# Patient Record
Sex: MALE | Race: WHITE | NOT HISPANIC OR LATINO
[De-identification: names, ages, dates, MRNs, and addresses within clinical notes are randomized per-mention and may not be internally consistent; named-entity substitution may affect disease eponyms.]

---

## 2020-06-29 PROBLEM — Z00.00 ENCOUNTER FOR PREVENTIVE HEALTH EXAMINATION: Status: ACTIVE | Noted: 2020-06-29

## 2020-06-30 ENCOUNTER — APPOINTMENT (OUTPATIENT)
Dept: PAIN MANAGEMENT | Facility: CLINIC | Age: 85
End: 2020-06-30
Payer: MEDICARE

## 2020-06-30 VITALS
TEMPERATURE: 97.7 F | SYSTOLIC BLOOD PRESSURE: 130 MMHG | HEIGHT: 74 IN | WEIGHT: 250 LBS | DIASTOLIC BLOOD PRESSURE: 70 MMHG | BODY MASS INDEX: 32.08 KG/M2

## 2020-06-30 PROCEDURE — 99204 OFFICE O/P NEW MOD 45 MIN: CPT

## 2020-06-30 RX ORDER — GABAPENTIN 100 MG/1
CAPSULE ORAL
Refills: 0 | Status: ACTIVE | COMMUNITY

## 2020-06-30 RX ORDER — LEVOTHYROXINE SODIUM 137 UG/1
TABLET ORAL
Refills: 0 | Status: ACTIVE | COMMUNITY

## 2020-06-30 RX ORDER — CELECOXIB 50 MG/1
CAPSULE ORAL
Refills: 0 | Status: ACTIVE | COMMUNITY

## 2020-06-30 RX ORDER — OXYCODONE HYDROCHLORIDE 30 MG/1
TABLET ORAL
Refills: 0 | Status: ACTIVE | COMMUNITY

## 2020-06-30 NOTE — CONSULT LETTER
[Dear  ___] : Dear  [unfilled], [Consult Letter:] : I had the pleasure of evaluating your patient, [unfilled]. [Consult Closing:] : Thank you very much for allowing me to participate in the care of this patient.  If you have any questions, please do not hesitate to contact me. [Please see my note below.] : Please see my note below. [FreeTextEntry3] : \par Dayton Muniz DO, MBA\par Director, Pain Management Center\par  of Anesthesiology\par [facility] [employer] [facility]\par \par \par  [Sincerely,] : Sincerely,

## 2020-06-30 NOTE — ASSESSMENT
[FreeTextEntry1] : I personally reviewed the relevant imaging.  Discussed and explained to patient the likely source of pathology and pain.  Questions answered.\par \par persistent pain secondary to lumbosacral stenosis and postlaminectomy pain demonstrated on imaging refractory to conservative treatments, will schedule caudal epidural steroid injection r/b/a discussed\par \par informed patient of risks of steroid administration including transient worsening of blood glucose, htn, mood changes, progressive osteoporosis.  Encouraged to call with questions and concerns.\par \par s/p covid infection - now recovered\par \par ASIPP COVID Morbidity risk stratification:\par \par Age 4\par Pulmonary 0\par CVS \par Obesity 3\par DM 0\par Renal 0 \par Hepatic 0\par Immune 0\par \par 7  low risk for covid related morbidity -  discussed r/b/a, patient wishes to proceed\par \par Will schedule above interventional pain procedure because further delay may cause harm or negative outcome to patient.  The goal in performing this procedure is to avoid deterioration of function, emergency room visits (which increases exposure) and reliance on opioids.  \par \par r/b/a discussed with patient, lack of evidence to conclusively determine whether pain management procedures have any positive or negative impact on the possibility of renetta the virus and/or development of any sequelae. \par \par Informed patient that risks associated with the COVID-19 infection.  Informed patient steps taken to limit the risks.  We are implementing safety precautions and following protocols consistent with the CDC and state recommendations. All patients and staff will be checked for fever or signs of illness upon entry to the facility. We will limit our steroid dose to the lowest effective therapeutic dose or in some cases steroids will not be injected at all. \par \par Patient agrees to proceed\par \par may be candidate for scs trial - information provided\par \par \par The above diagnosis and treatment plan is medically reasonable and necessary based on the patient encounter.\par \par There were no barriers to communication.\par Informed patient that I would be available for any additional questions.\par Patient was instructed to call with any worsening symptoms including severe pain, new numbness/weakness, or changes in the bowel/bladder function. \par \par Regarding opiate medication to manage pain. I had a detailed discussion with the patient regarding the risks of long-term opioid use, including the potential for medication side effects, hyperaglesia, endocrine dysfunction, addiction, tolerance, constipation, among others. Discussed relevant risks. \par \par Recommended weaning with current provider\par \par Discussed role of nsaids in pain management and all relevant risks, if patient is continuing to require after 4 weeks the patient should f/u for alternative treatment. \par Instructed patient to maintain pain diary to monitor pain level, mobility, and function.\par \par The referring provider was informed of the above diagnosis and treatment plan.\par

## 2020-06-30 NOTE — HISTORY OF PRESENT ILLNESS
[___ yrs] : [unfilled] year(s) ago [Constant] : constant [Sharp] : sharp [Medications] : medications [7] : a maximum pain level of 7/10 [FreeTextEntry1] : HPI\par \par Mr. HARLEEN WALLACE is a 85 year M with pmhx of left hip repalcement s/p multiple revisions, bilateral rotator cuff dysfunction lumbar fusion/decompression, and prostate ca s/p seeding with subsequent incontinence presents with worsening right buttock pain that is worse with transition and movement.  At times, the pain radiates to posterior thigh and calf. \par \par Of note patient states that he recovered from COVID-19 infection in March, denies any signs of infection including sob, fever, chills.\par \par \par Previous and current pain medications/doses/effects:\par \par Oxycodone 10mg q4h with improvement\par gabapentin 300/300/600 with improvement\par \par Previous Pain Treatments:\par \par PT without improvement\par \par Previous Pain Injections:\par \par Gluteal bursa injection over 5 months ago with improvement\par Previous Diagnostic Studies/Images:\par \par \par MRI LS 9/2019\par \par Surgical pedicular screw fixation L3 on L4 identified\par \par L1-2 bilateral facet hypertrophy is identified with resultant mild central and bilateral foraminal stenosis\par \par L2-3 grade 1 spondylitic the cyst with marketed disc space narrowing is identified bilateral neural foraminal stenosis is noted.\par \par L3-4 postsurgical changes are noted. The thecal sac is decompressed.\par \par L4-5 mild annular disc bulges identified however the thecal sac as well decompressed through the tenotomy defect.\par \par L5-S1 no focal disc herniation or neurocompressive changes are seen\par \par  [FreeTextEntry2] : 21 [FreeTextEntry7] : Right hip pain [FreeTextEntry3] : n/a

## 2020-06-30 NOTE — REASON FOR VISIT
[Initial Consultation] : an initial pain management consultation [FreeTextEntry2] : referred by Dr. Adwoa Gray for evaluation of back pain

## 2020-06-30 NOTE — REVIEW OF SYSTEMS
[Incontinence] : incontinence [Joint Stiffness] : joint stiffness [Joint Pain] : joint pain [Negative] : Heme/Lymph [FreeTextEntry3] : glasses [FreeTextEntry4] : hearing aids [FreeTextEntry9] : swelling

## 2020-06-30 NOTE — PHYSICAL EXAM
[Normal muscle bulk without asymmetry] : normal muscle bulk without asymmetry [Facet Tenderness] : facet tenderness [Spine: Flexion to ___ degrees, without pain] : spine: flexion to [unfilled] degrees, without pain [Antalgic] : antalgic [Wheelchair] : uses a wheelchair [] : Motor: [NL] : normal and symmetric bilaterally [Normal] : Normal affect [de-identified] : Constitutional: Normal, well developed, no acute distress\par Eyes: Symmetric, External structures \par Oropharynx: Lips normal, symmetric, no external lesions appreciated\par Respiratory: Non-labored breathing, no audible wheezes\par Cardiac: Pulse palpated, no tachycardia\par Vascular: No cyanosis appreciated, no edema in bilateral lower extremities\par GI: Nondistended, no jaundice appreciated\par Neurovascular: CN2-12 grossly intact, Alert and oriented\par MSK: Normal muscle bulk, 5/5 Motor strength B/L in LE\par \par

## 2020-07-13 ENCOUNTER — RESULT REVIEW (OUTPATIENT)
Age: 85
End: 2020-07-13

## 2020-07-16 ENCOUNTER — RESULT REVIEW (OUTPATIENT)
Age: 85
End: 2020-07-16

## 2020-07-16 ENCOUNTER — APPOINTMENT (OUTPATIENT)
Dept: PAIN MANAGEMENT | Facility: HOSPITAL | Age: 85
End: 2020-07-16

## 2020-08-18 ENCOUNTER — APPOINTMENT (OUTPATIENT)
Dept: PAIN MANAGEMENT | Facility: CLINIC | Age: 85
End: 2020-08-18
Payer: MEDICARE

## 2020-08-18 VITALS
BODY MASS INDEX: 32.08 KG/M2 | WEIGHT: 250 LBS | TEMPERATURE: 97.6 F | HEIGHT: 74 IN | DIASTOLIC BLOOD PRESSURE: 70 MMHG | SYSTOLIC BLOOD PRESSURE: 110 MMHG

## 2020-08-18 PROCEDURE — 99214 OFFICE O/P EST MOD 30 MIN: CPT

## 2020-08-18 NOTE — ASSESSMENT
[FreeTextEntry1] : \par persistent pain secondary to lumbosacral stenosis and postlaminectomy pain demonstrated on imaging refractory to conservative treatments,s/p  caudal epidural steroid injection start PT - referral provided\par \par subacromial bursitis and rotator cuff arthropathy pain refractory to conservative treatments.\par \par XR to evaluate arthropathy\par \par  will schedule RIGHT subacromial bursa steroid injection r/b/a discussed\par \par informed patient of risks of steroid administration including transient worsening of blood glucose, htn, mood changes, progressive osteoporosis.  Encouraged to call with questions and concerns.\par \par chronic UTI  r/b/a discussed.  Intervention may be associated with minimal increase risk of worsening infection.  patient understands and wishes to proceed\par \par Will schedule above interventional pain procedure because further delay may cause harm or negative outcome to patient.  The goal in performing this procedure is to avoid deterioration of function, emergency room visits (which increases exposure) and reliance on opioids.  \par \par r/b/a discussed with patient, lack of evidence to conclusively determine whether pain management procedures have any positive or negative impact on the possibility of renetta the virus and/or development of any sequelae. \par \par Informed patient that risks associated with the COVID-19 infection.  Informed patient steps taken to limit the risks.  We are implementing safety precautions and following protocols consistent with the CDC and state recommendations. All patients and staff will be checked for fever or signs of illness upon entry to the facility. We will limit our steroid dose to the lowest effective therapeutic dose or in some cases steroids will not be injected at all. \par \par Patient agrees to proceed\par \par may be candidate for scs trial - information provided\par \par \par The above diagnosis and treatment plan is medically reasonable and necessary based on the patient encounter.\par \par There were no barriers to communication.\par Informed patient that I would be available for any additional questions.\par Patient was instructed to call with any worsening symptoms including severe pain, new numbness/weakness, or changes in the bowel/bladder function. \par \par Regarding opiate medication to manage pain. I had a detailed discussion with the patient regarding the risks of long-term opioid use, including the potential for medication side effects, hyperaglesia, endocrine dysfunction, addiction, tolerance, constipation, among others. Discussed relevant risks. \par \par Recommended weaning with current provider\par \par Discussed role of nsaids in pain management and all relevant risks, if patient is continuing to require after 4 weeks the patient should f/u for alternative treatment. \par Instructed patient to maintain pain diary to monitor pain level, mobility, and function.\par \par \par

## 2020-08-18 NOTE — PHYSICAL EXAM
[Normal muscle bulk without asymmetry] : normal muscle bulk without asymmetry [Facet Tenderness] : facet tenderness [Spine: Flexion to ___ degrees, without pain] : spine: flexion to [unfilled] degrees, without pain [Normal] : Normal affect [Spurling] : positive Spurling's Test [Medeiros] : positive Medeiros Test

## 2020-08-21 ENCOUNTER — RESULT REVIEW (OUTPATIENT)
Age: 85
End: 2020-08-21

## 2020-09-07 ENCOUNTER — RESULT REVIEW (OUTPATIENT)
Age: 85
End: 2020-09-07

## 2020-09-10 ENCOUNTER — APPOINTMENT (OUTPATIENT)
Dept: PAIN MANAGEMENT | Facility: HOSPITAL | Age: 85
End: 2020-09-10

## 2020-09-25 ENCOUNTER — APPOINTMENT (OUTPATIENT)
Dept: PAIN MANAGEMENT | Facility: CLINIC | Age: 85
End: 2020-09-25

## 2020-09-29 ENCOUNTER — APPOINTMENT (OUTPATIENT)
Dept: PAIN MANAGEMENT | Facility: CLINIC | Age: 85
End: 2020-09-29
Payer: MEDICARE

## 2020-09-29 PROCEDURE — 99213 OFFICE O/P EST LOW 20 MIN: CPT | Mod: 95

## 2020-09-29 RX ORDER — OXYCODONE AND ACETAMINOPHEN 10; 325 MG/1; MG/1
10-325 TABLET ORAL
Qty: 120 | Refills: 0 | Status: ACTIVE | COMMUNITY
Start: 2020-09-22

## 2020-09-29 RX ORDER — SILVER SULFADIAZINE 10 MG/G
1 CREAM TOPICAL
Qty: 50 | Refills: 0 | Status: ACTIVE | COMMUNITY
Start: 2020-09-08

## 2020-09-29 RX ORDER — PHENAZOPYRIDINE HYDROCHLORIDE 100 MG/1
100 TABLET ORAL
Qty: 12 | Refills: 0 | Status: ACTIVE | COMMUNITY
Start: 2020-04-12

## 2020-09-29 RX ORDER — QUETIAPINE FUMARATE 25 MG/1
25 TABLET ORAL
Qty: 30 | Refills: 0 | Status: ACTIVE | COMMUNITY
Start: 2020-02-07

## 2020-09-29 RX ORDER — LEVOTHYROXINE SODIUM 0.03 MG/1
25 TABLET ORAL
Qty: 90 | Refills: 0 | Status: ACTIVE | COMMUNITY
Start: 2020-09-24

## 2020-09-29 RX ORDER — QUETIAPINE FUMARATE 50 MG/1
50 TABLET ORAL
Qty: 30 | Refills: 0 | Status: ACTIVE | COMMUNITY
Start: 2020-07-01

## 2020-09-29 RX ORDER — SULFAMETHOXAZOLE AND TRIMETHOPRIM 400; 80 MG/1; MG/1
400-80 TABLET ORAL
Qty: 42 | Refills: 0 | Status: ACTIVE | COMMUNITY
Start: 2020-07-30

## 2020-09-29 RX ORDER — DICLOFENAC SODIUM 10 MG/G
1 GEL TOPICAL
Qty: 200 | Refills: 0 | Status: ACTIVE | COMMUNITY
Start: 2020-08-10

## 2020-09-29 RX ORDER — MUPIROCIN 20 MG/G
2 OINTMENT TOPICAL
Qty: 22 | Refills: 0 | Status: ACTIVE | COMMUNITY
Start: 2020-03-30

## 2020-09-29 RX ORDER — CELECOXIB 200 MG/1
200 CAPSULE ORAL
Qty: 60 | Refills: 0 | Status: ACTIVE | COMMUNITY
Start: 2020-09-22

## 2020-09-29 RX ORDER — LEVOTHYROXINE SODIUM 0.2 MG/1
200 TABLET ORAL
Qty: 90 | Refills: 0 | Status: ACTIVE | COMMUNITY
Start: 2020-09-24

## 2020-09-29 RX ORDER — FUROSEMIDE 20 MG/1
20 TABLET ORAL
Qty: 90 | Refills: 0 | Status: ACTIVE | COMMUNITY
Start: 2020-06-08

## 2020-09-29 RX ORDER — TRAZODONE HYDROCHLORIDE 50 MG/1
50 TABLET ORAL
Qty: 30 | Refills: 0 | Status: ACTIVE | COMMUNITY
Start: 2020-02-24

## 2020-09-29 RX ORDER — PHENAZOPYRIDINE HYDROCHLORIDE 200 MG/1
200 TABLET ORAL
Qty: 9 | Refills: 0 | Status: ACTIVE | COMMUNITY
Start: 2020-07-28

## 2020-09-29 RX ORDER — MIRTAZAPINE 7.5 MG/1
7.5 TABLET, FILM COATED ORAL
Qty: 60 | Refills: 0 | Status: ACTIVE | COMMUNITY
Start: 2020-08-18

## 2020-09-29 RX ORDER — HYDROCORTISONE 25 MG/G
2.5 CREAM TOPICAL
Qty: 30 | Refills: 0 | Status: ACTIVE | COMMUNITY
Start: 2020-03-30

## 2020-09-29 RX ORDER — ESCITALOPRAM OXALATE 5 MG/1
5 TABLET ORAL
Qty: 30 | Refills: 0 | Status: ACTIVE | COMMUNITY
Start: 2020-04-23

## 2020-09-29 NOTE — ASSESSMENT
[FreeTextEntry1] : \par persistent pain secondary to lumbosacral stenosis and postlaminectomy pain demonstrated on imaging refractory to conservative treatments,s/p  caudal epidural steroid injection start PT - referral provided\par \par subacromial bursitis and rotator cuff arthropathy pain refractory to conservative treatments.\par \par XR to evaluate arthropathy\par \par s/p  RIGHT subacromial bursa steroid injection\par \par start PT - referral provided\par \par may be candidate for scs trial - information provided\par \par \par The above diagnosis and treatment plan is medically reasonable and necessary based on the patient encounter.\par \par There were no barriers to communication.\par Informed patient that I would be available for any additional questions.\par Patient was instructed to call with any worsening symptoms including severe pain, new numbness/weakness, or changes in the bowel/bladder function. \par \par Regarding opiate medication to manage pain. I had a detailed discussion with the patient regarding the risks of long-term opioid use, including the potential for medication side effects, hyperaglesia, endocrine dysfunction, addiction, tolerance, constipation, among others. Discussed relevant risks. \par \par Recommended weaning with current provider\par \par Discussed role of nsaids in pain management and all relevant risks, if patient is continuing to require after 4 weeks the patient should f/u for alternative treatment. \par Instructed patient to maintain pain diary to monitor pain level, mobility, and function.\par \par \par

## 2020-09-29 NOTE — HISTORY OF PRESENT ILLNESS
[___ yrs] : [unfilled] year(s) ago [Constant] : constant [7] : a maximum pain level of 7/10 [Sharp] : sharp [Medications] : medications [Home] : at home, [unfilled] , at the time of the visit. [Medical Office: ([facility])___] : at the medical office located in  [Verbal consent obtained from patient] : the patient, [unfilled] [FreeTextEntry1] : \par Interval Note:\par \par s/p right subacromial bursa steroid injection 9/10/2020 with improvement in right shoulder pain.  \par \par Continues to have improvement in back pain with PT \par \par Since last visit the pain is improved. Denies any additional weakness, numbness, bowel/bladder dysfunction.  Pain intensity is\par \par \par HPI\par \par Mr. HARLEEN WALLACE is a 85 year M with pmhx of left hip repalcement s/p multiple revisions, bilateral rotator cuff dysfunction lumbar fusion/decompression, and prostate ca s/p seeding with subsequent incontinence presents with worsening right buttock pain that is worse with transition and movement.  At times, the pain radiates to posterior thigh and calf. \par \par Of note patient states that he recovered from COVID-19 infection in March, denies any signs of infection including sob, fever, chills.\par \par \par Previous and current pain medications/doses/effects:\par \par Oxycodone 10mg q4h with improvement\par gabapentin 300/300/600 with improvement\par \par Previous Pain Treatments:\par \par PT without improvement\par \par Previous Pain Injections:\par caudal epidural steroid injection 6/16/2020\par Gluteal bursa injection over 5 months ago with improvement\par Previous Diagnostic Studies/Images:\par \par XR R Shoulder 8/20\par \par  Significant degenerative changes of the right shoulder girdle are identified with a high\par riding humeral head abutting the coracoid process and remodeling of the glenohumeral joint including\par associated bony sclerosis and degenerative changes. The subacromial space appears maintained.\par Suggestion of widening of the acromioclavicular joint with slight step off may represent a chronic\par separation. There is no evidence of fracture or suspicious lesion.\par \par  Further evaluation may include cross-sectional imaging as clinically warranted\par \par \par MRI LS 9/2019\par \par Surgical pedicular screw fixation L3 on L4 identified\par \par L1-2 bilateral facet hypertrophy is identified with resultant mild central and bilateral foraminal stenosis\par \par L2-3 grade 1 spondylitic the cyst with marketed disc space narrowing is identified bilateral neural foraminal stenosis is noted.\par \par L3-4 postsurgical changes are noted. The thecal sac is decompressed.\par \par L4-5 mild annular disc bulges identified however the thecal sac as well decompressed through the tenotomy defect.\par \par L5-S1 no focal disc herniation or neurocompressive changes are seen\par \par  [FreeTextEntry7] : Right hip pain [FreeTextEntry3] : n/a

## 2020-10-26 ENCOUNTER — APPOINTMENT (OUTPATIENT)
Dept: PAIN MANAGEMENT | Facility: CLINIC | Age: 85
End: 2020-10-26
Payer: MEDICARE

## 2020-10-26 PROCEDURE — 99213 OFFICE O/P EST LOW 20 MIN: CPT | Mod: 95

## 2020-10-26 RX ORDER — AMOXICILLIN 500 MG/1
500 CAPSULE ORAL
Qty: 21 | Refills: 0 | Status: ACTIVE | COMMUNITY
Start: 2020-10-15

## 2020-10-26 NOTE — PHYSICAL EXAM
[Normal] : Well developed, in no acute distress, alert and oriented to person, place and time [de-identified] : \par Constitutional: Normal, well developed, no acute distress on audio/video examination\par Eyes: Symmetric, External structures on video examination\par ENT: Lips, mucosa and tongue normal on video examination\par Oropharynx: Lips normal, symmetric, no external lesions appreciated appreciated on video examination\par Respiratory: Non-labored breathing, no audible wheezes appreciated on audio/video examination\par Vascular: No cyanosis appreciated or edema appreciated on video examination\par GI:  no jaundice appreciated on video examination\par Neurovascular: CN grossly intact on video/audio examination, alert\par MSK: Normal muscle bulk on video examination\par

## 2020-10-26 NOTE — ASSESSMENT
[FreeTextEntry1] : \par persistent pain secondary to lumbosacral stenosis and postlaminectomy pain demonstrated on imaging refractory to conservative treatments,s/p  caudal epidural steroid injection start PT - referral provided\par \par May consider repeat intervention\par \par continues to require oxycodone - I recommended alternative interventions including facet treatments and scs trial \par \par subacromial bursitis and rotator cuff arthropathy pain refractory to conservative treatments.\par \par \par s/p  RIGHT subacromial bursa steroid injection\par \par PT\par \par may be candidate for scs trial - information provided\par \par start OT for hand osteoarthritis\par \par The above diagnosis and treatment plan is medically reasonable and necessary based on the patient encounter.\par \par There were no barriers to communication.\par Informed patient that I would be available for any additional questions.\par Patient was instructed to call with any worsening symptoms including severe pain, new numbness/weakness, or changes in the bowel/bladder function. \par \par Regarding opiate medication to manage pain. I had a detailed discussion with the patient regarding the risks of long-term opioid use, including the potential for medication side effects, hyperaglesia, endocrine dysfunction, addiction, tolerance, constipation, among others. Discussed relevant risks. \par \par Recommended weaning with current provider\par \par Discussed role of nsaids in pain management and all relevant risks, if patient is continuing to require after 4 weeks the patient should f/u for alternative treatment. \par Instructed patient to maintain pain diary to monitor pain level, mobility, and function.\par \par I explained to patient benefits and limitation of TeleMedicine visits\par \par Patient understands that limitations include inability to perform comprehensive physical exam, which may lead to potential diagnostic inconsistencies.  \par \par Patient understands that diagnosis and treatment may be limited by these inconsistencies and patient agrees to proceed with care plan\par \par \par \par

## 2020-11-23 ENCOUNTER — APPOINTMENT (OUTPATIENT)
Dept: PAIN MANAGEMENT | Facility: CLINIC | Age: 85
End: 2020-11-23
Payer: MEDICARE

## 2020-11-23 DIAGNOSIS — M96.1 POSTLAMINECTOMY SYNDROME, NOT ELSEWHERE CLASSIFIED: ICD-10-CM

## 2020-11-23 DIAGNOSIS — F11.90 OPIOID USE, UNSPECIFIED, UNCOMPLICATED: ICD-10-CM

## 2020-11-23 DIAGNOSIS — M79.18 MYALGIA, OTHER SITE: ICD-10-CM

## 2020-11-23 DIAGNOSIS — M54.16 RADICULOPATHY, LUMBAR REGION: ICD-10-CM

## 2020-11-23 DIAGNOSIS — M75.51 BURSITIS OF RIGHT SHOULDER: ICD-10-CM

## 2020-11-23 DIAGNOSIS — M12.819 OTHER SPECIFIC ARTHROPATHIES, NOT ELSEWHERE CLASSIFIED, UNSPECIFIED SHOULDER: ICD-10-CM

## 2020-11-23 DIAGNOSIS — G89.4 CHRONIC PAIN SYNDROME: ICD-10-CM

## 2020-11-23 PROCEDURE — 99213 OFFICE O/P EST LOW 20 MIN: CPT | Mod: 95

## 2020-11-23 NOTE — ASSESSMENT
[FreeTextEntry1] : \par persistent pain secondary to lumbosacral stenosis and postlaminectomy pain demonstrated on imaging refractory to conservative treatments,s/p  caudal epidural steroid injection\par \par Start PT - may consider caudal aries\par \par May consider repeat intervention\par \par continues to require oxycodone - I recommended alternative interventions including facet treatments and scs trial \par \par subacromial bursitis and rotator cuff arthropathy pain refractory to conservative treatments.\par \par start PT facet arthropathy\par \par s/p  RIGHT subacromial bursa steroid injection\par \par PT\par \par may be candidate for scs trial - information provided\par \par to start OT for hand osteoarthritis\par \par The above diagnosis and treatment plan is medically reasonable and necessary based on the patient encounter.\par \par There were no barriers to communication.\par Informed patient that I would be available for any additional questions.\par Patient was instructed to call with any worsening symptoms including severe pain, new numbness/weakness, or changes in the bowel/bladder function. \par \par Regarding opiate medication to manage pain. I had a detailed discussion with the patient regarding the risks of long-term opioid use, including the potential for medication side effects, hyperaglesia, endocrine dysfunction, addiction, tolerance, constipation, among others. Discussed relevant risks. \par \par Recommended weaning with current provider (Dr. Robertson)\par \par Discussed role of nsaids in pain management and all relevant risks, if patient is continuing to require after 4 weeks the patient should f/u for alternative treatment. \par Instructed patient to maintain pain diary to monitor pain level, mobility, and function.\par \par I explained to patient benefits and limitation of TeleMedicine visits\par \par Patient understands that limitations include inability to perform comprehensive physical exam, which may lead to potential diagnostic inconsistencies.  \par \par Patient understands that diagnosis and treatment may be limited by these inconsistencies and patient agrees to proceed with care plan\par \par \par \par

## 2020-11-23 NOTE — HISTORY OF PRESENT ILLNESS
[___ yrs] : [unfilled] year(s) ago [Constant] : constant [7] : a maximum pain level of 7/10 [Sharp] : sharp [Medications] : medications [Home] : at home, [unfilled] , at the time of the visit. [Medical Office: ([facility])___] : at the medical office located in  [Verbal consent obtained from patient] : the patient, [unfilled] [FreeTextEntry1] : \par Interval Note:\par \par Continues PT.  Complains of mild bilateral leg pain.  He states that he continues on percocet 10/325 daily  \par \par Continues to have improvement in back pain with PT \par \par Since last visit the pain is improved. Denies any additional weakness, numbness, bowel/bladder dysfunction.  \par \par \par HPI\par \par Mr. HARLEEN WALLACE is a 85 year M with pmhx of left hip replacement s/p multiple revisions, bilateral rotator cuff dysfunction lumbar fusion/decompression, and prostate ca s/p seeding with subsequent incontinence presents with worsening right buttock pain that is worse with transition and movement.  At times, the pain radiates to posterior thigh and calf. 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 The subacromial space appears maintained.\par Suggestion of widening of the acromioclavicular joint with slight step off may represent a chronic\par separation. There is no evidence of fracture or suspicious lesion.\par \par  Further evaluation may include cross-sectional imaging as clinically warranted\par \par \par MRI LS 9/2019\par \par Surgical pedicular screw fixation L3 on L4 identified\par \par L1-2 bilateral facet hypertrophy is identified with resultant mild central and bilateral foraminal stenosis\par \par L2-3 grade 1 spondylitic the cyst with marketed disc space narrowing is identified bilateral neural foraminal stenosis is noted.\par \par L3-4 postsurgical changes are noted. The thecal sac is decompressed.\par \par L4-5 mild annular disc bulges identified however the thecal sac as well decompressed through the tenotomy defect.\par \par L5-S1 no focal disc herniation or neurocompressive changes are seen\par \par  [FreeTextEntry7] : Right hip pain [FreeTextEntry3] : n/a

## 2020-12-28 ENCOUNTER — NON-APPOINTMENT (OUTPATIENT)
Age: 85
End: 2020-12-28

## 2020-12-28 DIAGNOSIS — M19.042 PRIMARY OSTEOARTHRITIS, RIGHT HAND: ICD-10-CM

## 2020-12-28 DIAGNOSIS — M19.041 PRIMARY OSTEOARTHRITIS, RIGHT HAND: ICD-10-CM

## 2021-07-09 NOTE — HISTORY OF PRESENT ILLNESS
[___ yrs] : [unfilled] year(s) ago [Constant] : constant [7] : a maximum pain level of 7/10 [Sharp] : sharp [Medications] : medications [Home] : at home, [unfilled] , at the time of the visit. [Medical Office: ([facility])___] : at the medical office located in  [Verbal consent obtained from patient] : the patient, [unfilled] [FreeTextEntry1] : \par Interval Note:\par \par Continues PT for shoulder and lower back.  Pain is improved in the lower back, buttock.  Continues to take percocet 10/325 2 tabs BID.  \par s/p right subacromial bursa steroid injection 9/10/2020 with improvement in right shoulder pain.  \par \par Continues to have improvement in back pain with PT \par \par Since last visit the pain is improved. Denies any additional weakness, numbness, bowel/bladder dysfunction.  Pain intensity is\par \par \par HPI\par \par Mr. HARLEEN WALLACE is a 85 year M with pmhx of left hip replacement s/p multiple revisions, bilateral rotator cuff dysfunction lumbar fusion/decompression, and prostate ca s/p seeding with subsequent incontinence presents with worsening right buttock pain that is worse with transition and movement.  At times, the pain radiates to posterior thigh and calf. 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 The subacromial space appears maintained.\par Suggestion of widening of the acromioclavicular joint with slight step off may represent a chronic\par separation. There is no evidence of fracture or suspicious lesion.\par \par  Further evaluation may include cross-sectional imaging as clinically warranted\par \par \par MRI LS 9/2019\par \par Surgical pedicular screw fixation L3 on L4 identified\par \par L1-2 bilateral facet hypertrophy is identified with resultant mild central and bilateral foraminal stenosis\par \par L2-3 grade 1 spondylitic the cyst with marketed disc space narrowing is identified bilateral neural foraminal stenosis is noted.\par \par L3-4 postsurgical changes are noted. The thecal sac is decompressed.\par \par L4-5 mild annular disc bulges identified however the thecal sac as well decompressed through the tenotomy defect.\par \par L5-S1 no focal disc herniation or neurocompressive changes are seen\par \par  [FreeTextEntry7] : Right hip pain [FreeTextEntry3] : n/a oral